# Patient Record
Sex: MALE | Employment: PART TIME | URBAN - METROPOLITAN AREA
[De-identification: names, ages, dates, MRNs, and addresses within clinical notes are randomized per-mention and may not be internally consistent; named-entity substitution may affect disease eponyms.]

---

## 2024-05-15 ENCOUNTER — HOSPITAL ENCOUNTER (EMERGENCY)
Age: 29
Discharge: HOME | End: 2024-05-15
Attending: EMERGENCY MEDICINE

## 2024-05-15 ENCOUNTER — APPOINTMENT (OUTPATIENT)
Dept: CT IMAGING | Age: 29
End: 2024-05-15

## 2024-05-15 VITALS
BODY MASS INDEX: 22.9 KG/M2 | HEIGHT: 70 IN | DIASTOLIC BLOOD PRESSURE: 77 MMHG | OXYGEN SATURATION: 100 % | TEMPERATURE: 97.9 F | RESPIRATION RATE: 18 BRPM | SYSTOLIC BLOOD PRESSURE: 115 MMHG | HEART RATE: 69 BPM | WEIGHT: 160 LBS

## 2024-05-15 DIAGNOSIS — E86.0 DEHYDRATION: Primary | ICD-10-CM

## 2024-05-15 DIAGNOSIS — R11.2 NAUSEA AND VOMITING, UNSPECIFIED VOMITING TYPE: ICD-10-CM

## 2024-05-15 LAB
ALBUMIN SERPL-MCNC: 5.1 G/DL (ref 3.5–4.6)
ALP SERPL-CCNC: 60 U/L (ref 35–104)
ALT SERPL-CCNC: 44 U/L (ref 0–41)
AMPHET UR QL SCN: ABNORMAL
ANION GAP SERPL CALCULATED.3IONS-SCNC: 17 MEQ/L (ref 9–15)
AST SERPL-CCNC: 27 U/L (ref 0–40)
BARBITURATES UR QL SCN: ABNORMAL
BASOPHILS # BLD: 0 K/UL (ref 0–0.2)
BASOPHILS NFR BLD: 0.2 %
BENZODIAZ UR QL SCN: ABNORMAL
BILIRUB SERPL-MCNC: 1.6 MG/DL (ref 0.2–0.7)
BILIRUB UR QL STRIP: NEGATIVE
BUN SERPL-MCNC: 10 MG/DL (ref 6–20)
CALCIUM SERPL-MCNC: 10 MG/DL (ref 8.5–9.9)
CANNABINOIDS UR QL SCN: POSITIVE
CHLORIDE SERPL-SCNC: 103 MEQ/L (ref 95–107)
CLARITY UR: CLEAR
CO2 SERPL-SCNC: 21 MEQ/L (ref 20–31)
COCAINE UR QL SCN: ABNORMAL
COLOR UR: YELLOW
CREAT SERPL-MCNC: 0.94 MG/DL (ref 0.7–1.2)
DRUG SCREEN COMMENT UR-IMP: ABNORMAL
EOSINOPHIL # BLD: 0 K/UL (ref 0–0.7)
EOSINOPHIL NFR BLD: 0.5 %
ERYTHROCYTE [DISTWIDTH] IN BLOOD BY AUTOMATED COUNT: 11.9 % (ref 11.5–14.5)
FENTANYL SCREEN, URINE: ABNORMAL
GLOBULIN SER CALC-MCNC: 2.7 G/DL (ref 2.3–3.5)
GLUCOSE SERPL-MCNC: 83 MG/DL (ref 70–99)
GLUCOSE UR STRIP-MCNC: NEGATIVE MG/DL
HCT VFR BLD AUTO: 47.5 % (ref 42–52)
HGB BLD-MCNC: 16.9 G/DL (ref 14–18)
HGB UR QL STRIP: NEGATIVE
INFLUENZA A BY PCR: NEGATIVE
INFLUENZA B BY PCR: NEGATIVE
KETONES UR STRIP-MCNC: >=80 MG/DL
LEUKOCYTE ESTERASE UR QL STRIP: NEGATIVE
LIPASE SERPL-CCNC: 19 U/L (ref 12–95)
LYMPHOCYTES # BLD: 1.5 K/UL (ref 1–4.8)
LYMPHOCYTES NFR BLD: 17.2 %
MAGNESIUM SERPL-MCNC: 2.5 MG/DL (ref 1.7–2.4)
MCH RBC QN AUTO: 31.6 PG (ref 27–31.3)
MCHC RBC AUTO-ENTMCNC: 35.6 % (ref 33–37)
MCV RBC AUTO: 88.8 FL (ref 79–92.2)
METHADONE UR QL SCN: ABNORMAL
MONOCYTES # BLD: 0.5 K/UL (ref 0.2–0.8)
MONOCYTES NFR BLD: 5.6 %
NEUTROPHILS # BLD: 6.8 K/UL (ref 1.4–6.5)
NEUTS SEG NFR BLD: 76.3 %
NITRITE UR QL STRIP: NEGATIVE
OPIATES UR QL SCN: ABNORMAL
OXYCODONE UR QL SCN: ABNORMAL
PCP UR QL SCN: ABNORMAL
PH UR STRIP: 7 [PH] (ref 5–9)
PLATELET # BLD AUTO: 270 K/UL (ref 130–400)
POTASSIUM SERPL-SCNC: 4.4 MEQ/L (ref 3.4–4.9)
PROPOXYPH UR QL SCN: ABNORMAL
PROT SERPL-MCNC: 7.8 G/DL (ref 6.3–8)
PROT UR STRIP-MCNC: NEGATIVE MG/DL
RBC # BLD AUTO: 5.35 M/UL (ref 4.7–6.1)
SARS-COV-2 RDRP RESP QL NAA+PROBE: NOT DETECTED
SODIUM SERPL-SCNC: 141 MEQ/L (ref 135–144)
SP GR UR STRIP: 1.04 (ref 1–1.03)
URINE REFLEX TO CULTURE: ABNORMAL
UROBILINOGEN UR STRIP-ACNC: 1 E.U./DL
WBC # BLD AUTO: 8.9 K/UL (ref 4.8–10.8)

## 2024-05-15 PROCEDURE — 96374 THER/PROPH/DIAG INJ IV PUSH: CPT

## 2024-05-15 PROCEDURE — 6360000002 HC RX W HCPCS

## 2024-05-15 PROCEDURE — 2500000003 HC RX 250 WO HCPCS

## 2024-05-15 PROCEDURE — 96361 HYDRATE IV INFUSION ADD-ON: CPT

## 2024-05-15 PROCEDURE — 2580000003 HC RX 258

## 2024-05-15 PROCEDURE — 99285 EMERGENCY DEPT VISIT HI MDM: CPT

## 2024-05-15 PROCEDURE — 80053 COMPREHEN METABOLIC PANEL: CPT

## 2024-05-15 PROCEDURE — 83735 ASSAY OF MAGNESIUM: CPT

## 2024-05-15 PROCEDURE — 96375 TX/PRO/DX INJ NEW DRUG ADDON: CPT

## 2024-05-15 PROCEDURE — 80307 DRUG TEST PRSMV CHEM ANLYZR: CPT

## 2024-05-15 PROCEDURE — 36415 COLL VENOUS BLD VENIPUNCTURE: CPT

## 2024-05-15 PROCEDURE — A4216 STERILE WATER/SALINE, 10 ML: HCPCS

## 2024-05-15 PROCEDURE — 83690 ASSAY OF LIPASE: CPT

## 2024-05-15 PROCEDURE — 74177 CT ABD & PELVIS W/CONTRAST: CPT

## 2024-05-15 PROCEDURE — 85025 COMPLETE CBC W/AUTO DIFF WBC: CPT

## 2024-05-15 PROCEDURE — 81003 URINALYSIS AUTO W/O SCOPE: CPT

## 2024-05-15 PROCEDURE — 87502 INFLUENZA DNA AMP PROBE: CPT

## 2024-05-15 PROCEDURE — 6360000004 HC RX CONTRAST MEDICATION: Performed by: EMERGENCY MEDICINE

## 2024-05-15 PROCEDURE — 87635 SARS-COV-2 COVID-19 AMP PRB: CPT

## 2024-05-15 RX ORDER — ONDANSETRON 4 MG/1
4 TABLET, ORALLY DISINTEGRATING ORAL 3 TIMES DAILY PRN
Qty: 15 TABLET | Refills: 0 | Status: ON HOLD | OUTPATIENT
Start: 2024-05-15

## 2024-05-15 RX ORDER — ONDANSETRON 2 MG/ML
4 INJECTION INTRAMUSCULAR; INTRAVENOUS ONCE
Status: COMPLETED | OUTPATIENT
Start: 2024-05-15 | End: 2024-05-15

## 2024-05-15 RX ORDER — 0.9 % SODIUM CHLORIDE 0.9 %
1000 INTRAVENOUS SOLUTION INTRAVENOUS ONCE
Status: COMPLETED | OUTPATIENT
Start: 2024-05-15 | End: 2024-05-15

## 2024-05-15 RX ORDER — KETOROLAC TROMETHAMINE 30 MG/ML
30 INJECTION, SOLUTION INTRAMUSCULAR; INTRAVENOUS ONCE
Status: COMPLETED | OUTPATIENT
Start: 2024-05-15 | End: 2024-05-15

## 2024-05-15 RX ADMIN — ONDANSETRON 4 MG: 2 INJECTION INTRAMUSCULAR; INTRAVENOUS at 15:44

## 2024-05-15 RX ADMIN — FAMOTIDINE 20 MG: 10 INJECTION, SOLUTION INTRAVENOUS at 15:44

## 2024-05-15 RX ADMIN — SODIUM CHLORIDE 1000 ML: 9 INJECTION, SOLUTION INTRAVENOUS at 15:43

## 2024-05-15 RX ADMIN — IOPAMIDOL 75 ML: 755 INJECTION, SOLUTION INTRAVENOUS at 16:33

## 2024-05-15 RX ADMIN — KETOROLAC TROMETHAMINE 30 MG: 30 INJECTION INTRAMUSCULAR; INTRAVENOUS at 15:44

## 2024-05-15 ASSESSMENT — ENCOUNTER SYMPTOMS
WHEEZING: 0
ABDOMINAL DISTENTION: 0
COUGH: 0
PHOTOPHOBIA: 0
VOMITING: 1
EYE DISCHARGE: 0
CHEST TIGHTNESS: 0
ABDOMINAL PAIN: 1
NAUSEA: 1
SORE THROAT: 0
SHORTNESS OF BREATH: 0

## 2024-05-15 ASSESSMENT — PAIN DESCRIPTION - LOCATION
LOCATION: ABDOMEN
LOCATION: ABDOMEN

## 2024-05-15 ASSESSMENT — PAIN - FUNCTIONAL ASSESSMENT: PAIN_FUNCTIONAL_ASSESSMENT: 0-10

## 2024-05-15 ASSESSMENT — PAIN DESCRIPTION - PAIN TYPE: TYPE: ACUTE PAIN

## 2024-05-15 ASSESSMENT — PAIN DESCRIPTION - ONSET: ONSET: ON-GOING

## 2024-05-15 ASSESSMENT — LIFESTYLE VARIABLES
HOW OFTEN DO YOU HAVE A DRINK CONTAINING ALCOHOL: NEVER
HOW MANY STANDARD DRINKS CONTAINING ALCOHOL DO YOU HAVE ON A TYPICAL DAY: PATIENT DOES NOT DRINK

## 2024-05-15 ASSESSMENT — PAIN SCALES - GENERAL
PAINLEVEL_OUTOF10: 6
PAINLEVEL_OUTOF10: 7

## 2024-05-15 ASSESSMENT — PAIN DESCRIPTION - FREQUENCY: FREQUENCY: CONTINUOUS

## 2024-05-15 NOTE — ED NOTES
Assumed care of the patient at 1814 to discharge the patient.   Discharge education reviewed.  Patient instructed to follow up with PCP and come back to the ED with any new or worsening symptoms.   No questions or concerns at this time.  Medication prescriptions reviewed.

## 2024-05-15 NOTE — ED NOTES
Assumed care of patient from Regi BLAIR at 1540.  Patient was given medications.   No questions or concerns at this time.

## 2024-05-15 NOTE — ED PROVIDER NOTES
[facility] ED  eMERGENCY dEPARTMENT eNCOUnter      Pt Name: Cameron Waddell  MRN: [id number]  Birthdate 1995  Date of evaluation: 5/15/2024  Provider: Fred Laughlin MD    CHIEF COMPLAINT       Chief Complaint   Patient presents with    Abdominal Pain     With emesis x1 day    Fatigue     X3-4 weeks         HISTORY OF PRESENT ILLNESS   (Location/Symptom, Timing/Onset,Context/Setting, Quality, Duration, Modifying Factors, Severity)  Note limiting factors.   Cameron Waddell is a 29 y.o. male who presents to the emergency department for evaluation of nausea vomiting and abdominal pain.  Patient states he started feeling ill about 3 weeks ago with general malaise.  Symptoms progressed to a generalized lower abdominal pain and periumbilical starting 3 days ago.  In the last 24 hours he began vomiting and vomited several times overnight and into this morning.  No related fever or chills.  No urinary complaints.  No diarrhea.  No back pain.    HPI    NursingNotes were reviewed.    REVIEW OF SYSTEMS    (2-9 systems for level 4, 10 or more for level 5)     Review of Systems   Constitutional:  Negative for chills and diaphoresis.   HENT:  Negative for congestion, ear pain, mouth sores and sore throat.    Eyes:  Negative for photophobia and discharge.   Respiratory:  Negative for cough, chest tightness, shortness of breath and wheezing.    Cardiovascular:  Negative for chest pain and palpitations.   Gastrointestinal:  Positive for abdominal pain, nausea and vomiting. Negative for abdominal distention.   Endocrine: Negative for cold intolerance.   Genitourinary:  Negative for difficulty urinating.   Musculoskeletal:  Negative for arthralgias.   Skin:  Negative for pallor and rash.   Allergic/Immunologic: Negative for immunocompromised state.   Neurological:  Negative for dizziness and syncope.   Hematological:  Negative for adenopathy.   Psychiatric/Behavioral:  Negative for agitation and hallucinations.

## 2024-05-17 ENCOUNTER — APPOINTMENT (OUTPATIENT)
Dept: CT IMAGING | Age: 29
DRG: 103 | End: 2024-05-17

## 2024-05-17 ENCOUNTER — HOSPITAL ENCOUNTER (INPATIENT)
Age: 29
LOS: 3 days | Discharge: NURSING, REHAB OR LONG-TERM CARE | DRG: 103 | End: 2024-05-20
Attending: STUDENT IN AN ORGANIZED HEALTH CARE EDUCATION/TRAINING PROGRAM | Admitting: INTERNAL MEDICINE

## 2024-05-17 DIAGNOSIS — I63.9 CEREBROVASCULAR ACCIDENT (CVA), UNSPECIFIED MECHANISM (HCC): Primary | ICD-10-CM

## 2024-05-17 DIAGNOSIS — I63.213 CEREBROVASCULAR ACCIDENT (CVA) DUE TO BILATERAL OCCLUSION OF VERTEBRAL ARTERIES (HCC): ICD-10-CM

## 2024-05-17 DIAGNOSIS — I63.113 CEREBROVASCULAR ACCIDENT (CVA) DUE TO BILATERAL EMBOLISM OF VERTEBRAL ARTERIES (HCC): ICD-10-CM

## 2024-05-17 PROBLEM — R29.90 STROKE-LIKE SYMPTOMS: Status: ACTIVE | Noted: 2024-05-17

## 2024-05-17 LAB
ALBUMIN SERPL-MCNC: 4.8 G/DL (ref 3.5–4.6)
ALP SERPL-CCNC: 53 U/L (ref 35–104)
ALT SERPL-CCNC: 36 U/L (ref 0–41)
AMPHET UR QL SCN: ABNORMAL
ANION GAP SERPL CALCULATED.3IONS-SCNC: 16 MEQ/L (ref 9–15)
AST SERPL-CCNC: 30 U/L (ref 0–40)
BARBITURATES UR QL SCN: ABNORMAL
BASOPHILS # BLD: 0 K/UL (ref 0–0.2)
BASOPHILS NFR BLD: 0.2 %
BENZODIAZ UR QL SCN: ABNORMAL
BILIRUB SERPL-MCNC: 0.8 MG/DL (ref 0.2–0.7)
BUN SERPL-MCNC: 8 MG/DL (ref 6–20)
CALCIUM SERPL-MCNC: 9.7 MG/DL (ref 8.5–9.9)
CANNABINOIDS UR QL SCN: POSITIVE
CHLORIDE SERPL-SCNC: 105 MEQ/L (ref 95–107)
CO2 SERPL-SCNC: 19 MEQ/L (ref 20–31)
COCAINE UR QL SCN: ABNORMAL
CREAT SERPL-MCNC: 0.9 MG/DL (ref 0.7–1.2)
DRUG SCREEN COMMENT UR-IMP: ABNORMAL
EOSINOPHIL # BLD: 0 K/UL (ref 0–0.7)
EOSINOPHIL NFR BLD: 0.2 %
ERYTHROCYTE [DISTWIDTH] IN BLOOD BY AUTOMATED COUNT: 11.9 % (ref 11.5–14.5)
ETHANOL PERCENT: NORMAL G/DL
ETHANOLAMINE SERPL-MCNC: <10 MG/DL (ref 0–0.08)
FENTANYL SCREEN, URINE: ABNORMAL
GLOBULIN SER CALC-MCNC: 2.7 G/DL (ref 2.3–3.5)
GLUCOSE BLD-MCNC: 90 MG/DL (ref 70–99)
GLUCOSE SERPL-MCNC: 96 MG/DL (ref 70–99)
HCT VFR BLD AUTO: 47.1 % (ref 42–52)
HGB BLD-MCNC: 17.1 G/DL (ref 14–18)
INR PPP: 0.9
LYMPHOCYTES # BLD: 2.1 K/UL (ref 1–4.8)
LYMPHOCYTES NFR BLD: 26.3 %
MCH RBC QN AUTO: 31.8 PG (ref 27–31.3)
MCHC RBC AUTO-ENTMCNC: 36.3 % (ref 33–37)
MCV RBC AUTO: 87.5 FL (ref 79–92.2)
METHADONE UR QL SCN: ABNORMAL
MONOCYTES # BLD: 0.6 K/UL (ref 0.2–0.8)
MONOCYTES NFR BLD: 6.8 %
NEUTROPHILS # BLD: 5.3 K/UL (ref 1.4–6.5)
NEUTS SEG NFR BLD: 66.3 %
OPIATES UR QL SCN: ABNORMAL
OXYCODONE UR QL SCN: ABNORMAL
PCP UR QL SCN: ABNORMAL
PERFORMED ON: NORMAL
PHOSPHATE SERPL-MCNC: 1.3 MG/DL (ref 2.3–4.8)
PLATELET # BLD AUTO: 290 K/UL (ref 130–400)
POTASSIUM SERPL-SCNC: 4.5 MEQ/L (ref 3.4–4.9)
PROPOXYPH UR QL SCN: ABNORMAL
PROT SERPL-MCNC: 7.5 G/DL (ref 6.3–8)
PROTHROMBIN TIME: 12.3 SEC (ref 12.3–14.9)
RBC # BLD AUTO: 5.38 M/UL (ref 4.7–6.1)
SODIUM SERPL-SCNC: 140 MEQ/L (ref 135–144)
TROPONIN, HIGH SENSITIVITY: <6 NG/L (ref 0–19)
TSH REFLEX: 1.9 UIU/ML (ref 0.44–3.86)
WBC # BLD AUTO: 8 K/UL (ref 4.8–10.8)

## 2024-05-17 PROCEDURE — 84100 ASSAY OF PHOSPHORUS: CPT

## 2024-05-17 PROCEDURE — 82077 ASSAY SPEC XCP UR&BREATH IA: CPT

## 2024-05-17 PROCEDURE — 70450 CT HEAD/BRAIN W/O DYE: CPT

## 2024-05-17 PROCEDURE — 6370000000 HC RX 637 (ALT 250 FOR IP): Performed by: INTERNAL MEDICINE

## 2024-05-17 PROCEDURE — 80053 COMPREHEN METABOLIC PANEL: CPT

## 2024-05-17 PROCEDURE — 70496 CT ANGIOGRAPHY HEAD: CPT

## 2024-05-17 PROCEDURE — 85025 COMPLETE CBC W/AUTO DIFF WBC: CPT

## 2024-05-17 PROCEDURE — 82330 ASSAY OF CALCIUM: CPT

## 2024-05-17 PROCEDURE — 93005 ELECTROCARDIOGRAM TRACING: CPT | Performed by: STUDENT IN AN ORGANIZED HEALTH CARE EDUCATION/TRAINING PROGRAM

## 2024-05-17 PROCEDURE — 80307 DRUG TEST PRSMV CHEM ANLYZR: CPT

## 2024-05-17 PROCEDURE — 99285 EMERGENCY DEPT VISIT HI MDM: CPT

## 2024-05-17 PROCEDURE — 37195 THROMBOLYTIC THERAPY STROKE: CPT

## 2024-05-17 PROCEDURE — 1210000000 HC MED SURG R&B

## 2024-05-17 PROCEDURE — 6360000004 HC RX CONTRAST MEDICATION: Performed by: STUDENT IN AN ORGANIZED HEALTH CARE EDUCATION/TRAINING PROGRAM

## 2024-05-17 PROCEDURE — 70498 CT ANGIOGRAPHY NECK: CPT

## 2024-05-17 PROCEDURE — 2580000003 HC RX 258: Performed by: STUDENT IN AN ORGANIZED HEALTH CARE EDUCATION/TRAINING PROGRAM

## 2024-05-17 PROCEDURE — 84443 ASSAY THYROID STIM HORMONE: CPT

## 2024-05-17 PROCEDURE — 6360000002 HC RX W HCPCS: Performed by: STUDENT IN AN ORGANIZED HEALTH CARE EDUCATION/TRAINING PROGRAM

## 2024-05-17 PROCEDURE — 85610 PROTHROMBIN TIME: CPT

## 2024-05-17 PROCEDURE — 84484 ASSAY OF TROPONIN QUANT: CPT

## 2024-05-17 PROCEDURE — 3E03317 INTRODUCTION OF OTHER THROMBOLYTIC INTO PERIPHERAL VEIN, PERCUTANEOUS APPROACH: ICD-10-PCS | Performed by: INTERNAL MEDICINE

## 2024-05-17 RX ORDER — ATORVASTATIN CALCIUM 40 MG/1
40 TABLET, FILM COATED ORAL NIGHTLY
Status: DISCONTINUED | OUTPATIENT
Start: 2024-05-17 | End: 2024-05-20 | Stop reason: HOSPADM

## 2024-05-17 RX ORDER — ASPIRIN 300 MG/1
300 SUPPOSITORY RECTAL DAILY
Status: DISCONTINUED | OUTPATIENT
Start: 2024-05-18 | End: 2024-05-20 | Stop reason: HOSPADM

## 2024-05-17 RX ORDER — SODIUM CHLORIDE 0.9 % (FLUSH) 0.9 %
5-40 SYRINGE (ML) INJECTION PRN
Status: DISCONTINUED | OUTPATIENT
Start: 2024-05-17 | End: 2024-05-20 | Stop reason: HOSPADM

## 2024-05-17 RX ORDER — ASPIRIN 81 MG/1
81 TABLET, CHEWABLE ORAL DAILY
Status: DISCONTINUED | OUTPATIENT
Start: 2024-05-18 | End: 2024-05-20 | Stop reason: HOSPADM

## 2024-05-17 RX ORDER — ENOXAPARIN SODIUM 100 MG/ML
40 INJECTION SUBCUTANEOUS DAILY
Status: DISCONTINUED | OUTPATIENT
Start: 2024-05-18 | End: 2024-05-20 | Stop reason: HOSPADM

## 2024-05-17 RX ORDER — LORAZEPAM 2 MG/ML
0.5 INJECTION INTRAMUSCULAR ONCE
Status: COMPLETED | OUTPATIENT
Start: 2024-05-17 | End: 2024-05-17

## 2024-05-17 RX ORDER — HYDRALAZINE HYDROCHLORIDE 20 MG/ML
10 INJECTION INTRAMUSCULAR; INTRAVENOUS EVERY 6 HOURS PRN
Status: DISCONTINUED | OUTPATIENT
Start: 2024-05-17 | End: 2024-05-20 | Stop reason: HOSPADM

## 2024-05-17 RX ORDER — SODIUM CHLORIDE 0.9 % (FLUSH) 0.9 %
10 SYRINGE (ML) INJECTION ONCE
Status: COMPLETED | OUTPATIENT
Start: 2024-05-17 | End: 2024-05-17

## 2024-05-17 RX ORDER — ONDANSETRON 4 MG/1
4 TABLET, ORALLY DISINTEGRATING ORAL EVERY 8 HOURS PRN
Status: DISCONTINUED | OUTPATIENT
Start: 2024-05-17 | End: 2024-05-20 | Stop reason: HOSPADM

## 2024-05-17 RX ORDER — SODIUM CHLORIDE 0.9 % (FLUSH) 0.9 %
5-40 SYRINGE (ML) INJECTION EVERY 12 HOURS SCHEDULED
Status: DISCONTINUED | OUTPATIENT
Start: 2024-05-17 | End: 2024-05-20 | Stop reason: HOSPADM

## 2024-05-17 RX ORDER — POLYETHYLENE GLYCOL 3350 17 G/17G
17 POWDER, FOR SOLUTION ORAL DAILY PRN
Status: DISCONTINUED | OUTPATIENT
Start: 2024-05-17 | End: 2024-05-20 | Stop reason: HOSPADM

## 2024-05-17 RX ORDER — ONDANSETRON 2 MG/ML
4 INJECTION INTRAMUSCULAR; INTRAVENOUS EVERY 6 HOURS PRN
Status: DISCONTINUED | OUTPATIENT
Start: 2024-05-17 | End: 2024-05-20 | Stop reason: HOSPADM

## 2024-05-17 RX ORDER — SODIUM CHLORIDE 9 MG/ML
INJECTION, SOLUTION INTRAVENOUS PRN
Status: DISCONTINUED | OUTPATIENT
Start: 2024-05-17 | End: 2024-05-20 | Stop reason: HOSPADM

## 2024-05-17 RX ADMIN — SODIUM CHLORIDE, PRESERVATIVE FREE 10 ML: 5 INJECTION INTRAVENOUS at 22:24

## 2024-05-17 RX ADMIN — Medication 0.5 MG: at 22:23

## 2024-05-17 RX ADMIN — TENECTEPLASE 18 MG: KIT at 19:54

## 2024-05-17 RX ADMIN — ATORVASTATIN CALCIUM 40 MG: 40 TABLET, FILM COATED ORAL at 22:14

## 2024-05-17 RX ADMIN — SODIUM CHLORIDE, PRESERVATIVE FREE 10 ML: 5 INJECTION INTRAVENOUS at 19:54

## 2024-05-17 RX ADMIN — IOPAMIDOL 75 ML: 755 INJECTION, SOLUTION INTRAVENOUS at 19:27

## 2024-05-17 NOTE — ED PROVIDER NOTES
CONTRAST   Final Result   Addendum (preliminary) 1 of 1   ADDENDUM:   Results were called by core team to Dr. BECERRA on 5/17/2024 at 19:59.         Final   No acute intracranial abnormality.            CTA HEAD W WO CONTRAST   Final Result   1. No intracranial arterial stenosis.   2. No evidence of stenosis involving the carotid arteries or vertebral   arteries.         CTA NECK W WO CONTRAST   Final Result   1. No intracranial arterial stenosis.   2. No evidence of stenosis involving the carotid arteries or vertebral   arteries.               ED BEDSIDE ULTRASOUND:   Performed by ED Physician - none    LABS:  Labs Reviewed   CBC WITH AUTO DIFFERENTIAL - Abnormal; Notable for the following components:       Result Value    MCH 31.8 (*)     All other components within normal limits   COMPREHENSIVE METABOLIC PANEL W/ REFLEX TO MG FOR LOW K - Abnormal; Notable for the following components:    CO2 19 (*)     Anion Gap 16 (*)     Albumin 4.8 (*)     Total Bilirubin 0.8 (*)     All other components within normal limits   PHOSPHORUS - Abnormal; Notable for the following components:    Phosphorus 1.3 (*)     All other components within normal limits   URINE DRUG SCREEN - Abnormal; Notable for the following components:    Cannabinoid Scrn, Ur POSITIVE (*)     All other components within normal limits   PROTIME-INR   TROPONIN   TSH WITH REFLEX   ETHANOL   CALCIUM, IONIZED   POCT GLUCOSE   POCT GLUCOSE       All other labs were within normal range or not returned as of this dictation.    EMERGENCY DEPARTMENT COURSE and DIFFERENTIAL DIAGNOSIS/MDM:   Vitals:    Vitals:    05/17/24 2015 05/17/24 2030 05/17/24 2045 05/17/24 2100   BP: 123/88 123/83 122/81 112/75   Pulse: 56 72 63 62   Resp: (!) 0 14 12 11   Temp:       SpO2: 99% 100% 99% 100%   Weight:           29-year-old male presenting to the emergency department for evaluation of sudden onset altered mental status.  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

## 2024-05-18 ENCOUNTER — APPOINTMENT (OUTPATIENT)
Dept: MRI IMAGING | Age: 29
DRG: 103 | End: 2024-05-18
Attending: PSYCHIATRY & NEUROLOGY

## 2024-05-18 ENCOUNTER — APPOINTMENT (OUTPATIENT)
Age: 29
DRG: 103 | End: 2024-05-18
Attending: PSYCHIATRY & NEUROLOGY

## 2024-05-18 LAB
B-OH-BUTYR SERPL-SCNC: 0.8 MG/DL (ref 0.2–2.8)
C-REACTIVE PROTEIN, HIGH SENSITIVITY: 1.1 MG/L (ref 0–5)
CHOLEST SERPL-MCNC: 122 MG/DL (ref 0–199)
ECHO AO ROOT DIAM: 2.6 CM
ECHO AO ROOT INDEX: 1.36 CM/M2
ECHO AV AREA PEAK VELOCITY: 2 CM2
ECHO AV AREA VTI: 1.8 CM2
ECHO AV AREA/BSA PEAK VELOCITY: 1 CM2/M2
ECHO AV AREA/BSA VTI: 0.9 CM2/M2
ECHO AV CUSP MM: 1.5 CM
ECHO AV MEAN GRADIENT: 3 MMHG
ECHO AV MEAN VELOCITY: 0.9 M/S
ECHO AV PEAK GRADIENT: 6 MMHG
ECHO AV PEAK VELOCITY: 1.2 M/S
ECHO AV VELOCITY RATIO: 0.92
ECHO AV VTI: 27.2 CM
ECHO BSA: 1.91 M2
ECHO LA DIAMETER INDEX: 1.41 CM/M2
ECHO LA DIAMETER: 2.7 CM
ECHO LA TO AORTIC ROOT RATIO: 1.04
ECHO LA VOL A-L A2C: 40 ML (ref 18–58)
ECHO LA VOL A-L A4C: 49 ML (ref 18–58)
ECHO LA VOL MOD A2C: 39 ML (ref 18–58)
ECHO LA VOL MOD A4C: 43 ML (ref 18–58)
ECHO LA VOLUME AREA LENGTH: 46 ML
ECHO LA VOLUME INDEX A-L A2C: 21 ML/M2 (ref 16–34)
ECHO LA VOLUME INDEX A-L A4C: 26 ML/M2 (ref 16–34)
ECHO LA VOLUME INDEX AREA LENGTH: 24 ML/M2 (ref 16–34)
ECHO LA VOLUME INDEX MOD A2C: 20 ML/M2 (ref 16–34)
ECHO LA VOLUME INDEX MOD A4C: 23 ML/M2 (ref 16–34)
ECHO LV E' LATERAL VELOCITY: 18 CM/S
ECHO LV E' SEPTAL VELOCITY: 17 CM/S
ECHO LV EDV A2C: 83 ML
ECHO LV EDV A4C: 100 ML
ECHO LV EDV BP: 92 ML (ref 67–155)
ECHO LV EDV INDEX A4C: 52 ML/M2
ECHO LV EDV INDEX BP: 48 ML/M2
ECHO LV EDV NDEX A2C: 43 ML/M2
ECHO LV EJECTION FRACTION A2C: 58 %
ECHO LV EJECTION FRACTION A4C: 64 %
ECHO LV EJECTION FRACTION BIPLANE: 62 % (ref 55–100)
ECHO LV ESV A2C: 35 ML
ECHO LV ESV A4C: 36 ML
ECHO LV ESV BP: 35 ML (ref 22–58)
ECHO LV ESV INDEX A2C: 18 ML/M2
ECHO LV ESV INDEX A4C: 19 ML/M2
ECHO LV ESV INDEX BP: 18 ML/M2
ECHO LV INTERNAL DIMENSION DIASTOLE INDEX: 2.3 CM/M2
ECHO LV INTERNAL DIMENSION DIASTOLIC: 4.4 CM (ref 4.2–5.9)
ECHO LV IVSD: 0.8 CM (ref 0.6–1)
ECHO LV MASS 2D: 137.8 G (ref 88–224)
ECHO LV MASS INDEX 2D: 72.1 G/M2 (ref 49–115)
ECHO LV POSTERIOR WALL DIASTOLIC: 1.1 CM (ref 0.6–1)
ECHO LV RELATIVE WALL THICKNESS RATIO: 0.5
ECHO LVOT AREA: 2.3 CM2
ECHO LVOT AV VTI INDEX: 0.85
ECHO LVOT DIAM: 1.7 CM
ECHO LVOT MEAN GRADIENT: 3 MMHG
ECHO LVOT PEAK GRADIENT: 5 MMHG
ECHO LVOT PEAK VELOCITY: 1.1 M/S
ECHO LVOT STROKE VOLUME INDEX: 27.4 ML/M2
ECHO LVOT SV: 52.4 ML
ECHO LVOT VTI: 23.1 CM
ECHO MV A VELOCITY: 0.45 M/S
ECHO MV E DECELERATION TIME (DT): 141.4 MS
ECHO MV E VELOCITY: 0.85 M/S
ECHO MV E/A RATIO: 1.89
ECHO MV E/E' LATERAL: 4.72
ECHO MV E/E' RATIO (AVERAGED): 4.86
ECHO MV E/E' SEPTAL: 5
ECHO MV REGURGITANT PEAK GRADIENT: 92 MMHG
ECHO MV REGURGITANT PEAK VELOCITY: 4.8 M/S
ECHO PV MAX VELOCITY: 0.9 M/S
ECHO PV PEAK GRADIENT: 3 MMHG
ECHO PVEIN PEAK D VELOCITY: 0.8 M/S
ECHO PVEIN PEAK S VELOCITY: 0.5 M/S
ECHO PVEIN S/D RATIO: 0.6
ECHO RV INTERNAL DIMENSION: 3 CM
ECHO RV TAPSE: 1.9 CM (ref 1.7–?)
ECHO TV REGURGITANT MAX VELOCITY: 2.44 M/S
ECHO TV REGURGITANT PEAK GRADIENT: 24 MMHG
ERYTHROCYTE [DISTWIDTH] IN BLOOD BY AUTOMATED COUNT: 11.8 % (ref 11.5–14.5)
ERYTHROCYTE [SEDIMENTATION RATE] IN BLOOD BY WESTERGREN METHOD: 1 MM (ref 0–10)
ESTIMATED AVERAGE GLUCOSE: 85 MG/DL
HBA1C MFR BLD: 4.6 % (ref 4–6)
HCT VFR BLD AUTO: 43.3 % (ref 42–52)
HDLC SERPL-MCNC: 41 MG/DL (ref 40–59)
HGB BLD-MCNC: 15.4 G/DL (ref 14–18)
LDLC SERPL CALC-MCNC: 63 MG/DL (ref 0–129)
MCH RBC QN AUTO: 31.5 PG (ref 27–31.3)
MCHC RBC AUTO-ENTMCNC: 35.6 % (ref 33–37)
MCV RBC AUTO: 88.5 FL (ref 79–92.2)
PHOSPHATE SERPL-MCNC: 4 MG/DL (ref 2.3–4.8)
PLATELET # BLD AUTO: 241 K/UL (ref 130–400)
RBC # BLD AUTO: 4.89 M/UL (ref 4.7–6.1)
TRIGL SERPL-MCNC: 92 MG/DL (ref 0–150)
WBC # BLD AUTO: 6.8 K/UL (ref 4.8–10.8)

## 2024-05-18 PROCEDURE — 83516 IMMUNOASSAY NONANTIBODY: CPT

## 2024-05-18 PROCEDURE — 86141 C-REACTIVE PROTEIN HS: CPT

## 2024-05-18 PROCEDURE — 85610 PROTHROMBIN TIME: CPT

## 2024-05-18 PROCEDURE — 6370000000 HC RX 637 (ALT 250 FOR IP): Performed by: INTERNAL MEDICINE

## 2024-05-18 PROCEDURE — 6360000004 HC RX CONTRAST MEDICATION: Performed by: PSYCHIATRY & NEUROLOGY

## 2024-05-18 PROCEDURE — 86147 CARDIOLIPIN ANTIBODY EA IG: CPT

## 2024-05-18 PROCEDURE — 1210000000 HC MED SURG R&B

## 2024-05-18 PROCEDURE — 86235 NUCLEAR ANTIGEN ANTIBODY: CPT

## 2024-05-18 PROCEDURE — 85027 COMPLETE CBC AUTOMATED: CPT

## 2024-05-18 PROCEDURE — 2580000003 HC RX 258: Performed by: INTERNAL MEDICINE

## 2024-05-18 PROCEDURE — 6360000002 HC RX W HCPCS: Performed by: INTERNAL MEDICINE

## 2024-05-18 PROCEDURE — 84100 ASSAY OF PHOSPHORUS: CPT

## 2024-05-18 PROCEDURE — 86225 DNA ANTIBODY NATIVE: CPT

## 2024-05-18 PROCEDURE — 36415 COLL VENOUS BLD VENIPUNCTURE: CPT

## 2024-05-18 PROCEDURE — 85730 THROMBOPLASTIN TIME PARTIAL: CPT

## 2024-05-18 PROCEDURE — 85302 CLOT INHIBIT PROT C ANTIGEN: CPT

## 2024-05-18 PROCEDURE — 85652 RBC SED RATE AUTOMATED: CPT

## 2024-05-18 PROCEDURE — 93306 TTE W/DOPPLER COMPLETE: CPT

## 2024-05-18 PROCEDURE — 93306 TTE W/DOPPLER COMPLETE: CPT | Performed by: INTERNAL MEDICINE

## 2024-05-18 PROCEDURE — 83036 HEMOGLOBIN GLYCOSYLATED A1C: CPT

## 2024-05-18 PROCEDURE — 80061 LIPID PANEL: CPT

## 2024-05-18 PROCEDURE — 70546 MR ANGIOGRAPH HEAD W/O&W/DYE: CPT

## 2024-05-18 PROCEDURE — A9579 GAD-BASE MR CONTRAST NOS,1ML: HCPCS | Performed by: PSYCHIATRY & NEUROLOGY

## 2024-05-18 PROCEDURE — 85613 RUSSELL VIPER VENOM DILUTED: CPT

## 2024-05-18 PROCEDURE — 82010 KETONE BODYS QUAN: CPT

## 2024-05-18 PROCEDURE — 81241 F5 GENE: CPT

## 2024-05-18 PROCEDURE — 85305 CLOT INHIBIT PROT S TOTAL: CPT

## 2024-05-18 PROCEDURE — 2500000003 HC RX 250 WO HCPCS: Performed by: INTERNAL MEDICINE

## 2024-05-18 PROCEDURE — 70553 MRI BRAIN STEM W/O & W/DYE: CPT

## 2024-05-18 PROCEDURE — 99291 CRITICAL CARE FIRST HOUR: CPT | Performed by: PSYCHIATRY & NEUROLOGY

## 2024-05-18 PROCEDURE — 2580000003 HC RX 258: Performed by: STUDENT IN AN ORGANIZED HEALTH CARE EDUCATION/TRAINING PROGRAM

## 2024-05-18 RX ORDER — THIAMINE HYDROCHLORIDE 100 MG/ML
500 INJECTION, SOLUTION INTRAMUSCULAR; INTRAVENOUS EVERY 8 HOURS
Status: DISCONTINUED | OUTPATIENT
Start: 2024-05-18 | End: 2024-05-18

## 2024-05-18 RX ORDER — LANOLIN ALCOHOL/MO/W.PET/CERES
100 CREAM (GRAM) TOPICAL DAILY
Status: DISCONTINUED | OUTPATIENT
Start: 2024-05-19 | End: 2024-05-20 | Stop reason: HOSPADM

## 2024-05-18 RX ORDER — MECOBALAMIN 5000 MCG
5 TABLET,DISINTEGRATING ORAL NIGHTLY
Status: DISCONTINUED | OUTPATIENT
Start: 2024-05-18 | End: 2024-05-20 | Stop reason: HOSPADM

## 2024-05-18 RX ORDER — DEXTROSE AND SODIUM CHLORIDE 5; .9 G/100ML; G/100ML
INJECTION, SOLUTION INTRAVENOUS CONTINUOUS
Status: DISPENSED | OUTPATIENT
Start: 2024-05-18 | End: 2024-05-18

## 2024-05-18 RX ADMIN — Medication 10 ML: at 09:57

## 2024-05-18 RX ADMIN — ATORVASTATIN CALCIUM 40 MG: 40 TABLET, FILM COATED ORAL at 21:27

## 2024-05-18 RX ADMIN — THIAMINE HYDROCHLORIDE 500 MG: 100 INJECTION, SOLUTION INTRAMUSCULAR; INTRAVENOUS at 21:27

## 2024-05-18 RX ADMIN — DEXTROSE AND SODIUM CHLORIDE: 5; 900 INJECTION, SOLUTION INTRAVENOUS at 09:56

## 2024-05-18 RX ADMIN — SODIUM PHOSPHATE, MONOBASIC, MONOHYDRATE AND SODIUM PHOSPHATE, DIBASIC, ANHYDROUS 30 MMOL: 142; 276 INJECTION, SOLUTION INTRAVENOUS at 09:03

## 2024-05-18 RX ADMIN — THIAMINE HYDROCHLORIDE 500 MG: 100 INJECTION, SOLUTION INTRAMUSCULAR; INTRAVENOUS at 09:50

## 2024-05-18 RX ADMIN — ENOXAPARIN SODIUM 40 MG: 100 INJECTION SUBCUTANEOUS at 21:27

## 2024-05-18 RX ADMIN — ASPIRIN 81 MG 81 MG: 81 TABLET ORAL at 21:27

## 2024-05-18 RX ADMIN — GADOTERIDOL 15 ML: 279.3 INJECTION, SOLUTION INTRAVENOUS at 13:28

## 2024-05-18 NOTE — ED NOTES
Patient appears to be  sleeping. Respirations are even and unlabored. No distress noted at this time.

## 2024-05-18 NOTE — CONSULTS
Subjective:      Patient ID: Cameron Waddell is a 29 y.o. male who presents today for stroke like symptoms..    HPI 29-year-old right-handed gentleman with a history of strokelike symptoms.  The patient works at [employer] and was brought with sudden change in mental status.  Other neurological findings with disconjugate gaze.  I discussed this case with the ER physician Dr. 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  He also had crossed limb weakness from left upper and right lower and that was disoriented.  At that time we were not aware that he had a headache as he was not able to comprehend very well.  Given these findings and acute onset it likely is a vascular etiology and therefore patient became a candidate for TNK with a NIH score of 11.  The other differential diagnosis and situation is demyelination seen in multiple sclerosis or just plain vasospasm.  Patient had smoked considerable amount of marijuana in the morning but he reports today that he has done this before and nothing has happened.  He did though have a bad headache prior to coming in which she had not reported.  Confusional basilar migraine is another possibility.  Not taking the chance we did treat him with TNK and he improved within about 10 minutes.  At this time is completely nonfocal.  He has had 2 seizures in January for which she had not seek to any attention.  Patient has a history of significant alcohol use in the past but not recent.  Patient may have a low threshold for seizures.    Review of Systems   Constitutional:  Negative for fever.   HENT:  Negative for ear pain, tinnitus and trouble swallowing.    Eyes:  Negative for photophobia and visual disturbance.   Respiratory:  Negative for choking and shortness of breath.    Cardiovascular:  Negative for chest pain and palpitations.   Gastrointestinal:  Negative for nausea and vomiting.   Musculoskeletal:  Negative for back pain, gait

## 2024-05-18 NOTE — H&P
and/or weight based adjustment of the mA/kV was utilized to reduce the radiation dose to as low as reasonably achievable. COMPARISON: None HISTORY: ORDERING SYSTEM PROVIDED HISTORY: stroke symptoms TECHNOLOGIST PROVIDED HISTORY: Reason for exam:->stroke symptoms Additional Contrast?->1 What reading provider will be dictating this exam?->CRC FINDINGS: CTA head: Anterior cerebral arteries and middle cerebral arteries are patent without evidence of stenosis.  No stenosis involving the posterior cerebral arteries. No evidence of stenosis involving the basilar artery.  Right vertebral artery appears dominant.  No evidence of dural venous sinus thrombosis.  No intracranial aneurysm CTA neck: Common carotid arteries are patent without evidence of stenosis.  Carotid bulbs are unremarkable.  No evidence of stenosis or dissection involving the carotid arteries.  Right vertebral artery is dominant.  No evidence of stenosis or dissection involving the carotid arteries.     1. No intracranial arterial stenosis. 2. No evidence of stenosis involving the carotid arteries or vertebral arteries.     CTA NECK W WO CONTRAST    Result Date: 5/17/2024  EXAMINATION: CTA OF THE HEAD WITHOUT AND WITH CONTRAST; CTA OF THE NECK 5/17/2024 7:19 pm TECHNIQUE: CTA of the head/brain was performed without and with the administration of intravenous contrast. Multiplanar reformatted images are provided for review. MIP images are provided for review. Automated exposure control, iterative reconstruction, and/or weight based adjustment of the mA/kV was utilized to reduce the radiation dose to as low as reasonably achievable.; CTA of the neck was performed with the administration of intravenous contrast. Multiplanar reformatted images are provided for review.  MIP images are provided for review. Stenosis of the internal carotid arteries measured using NASCET criteria. Automated exposure control, iterative reconstruction, and/or weight based adjustment of

## 2024-05-19 PROCEDURE — 2580000003 HC RX 258: Performed by: INTERNAL MEDICINE

## 2024-05-19 PROCEDURE — 99232 SBSQ HOSP IP/OBS MODERATE 35: CPT | Performed by: PSYCHIATRY & NEUROLOGY

## 2024-05-19 PROCEDURE — 6360000002 HC RX W HCPCS: Performed by: INTERNAL MEDICINE

## 2024-05-19 PROCEDURE — 1210000000 HC MED SURG R&B

## 2024-05-19 PROCEDURE — 2580000003 HC RX 258: Performed by: STUDENT IN AN ORGANIZED HEALTH CARE EDUCATION/TRAINING PROGRAM

## 2024-05-19 PROCEDURE — 6370000000 HC RX 637 (ALT 250 FOR IP): Performed by: INTERNAL MEDICINE

## 2024-05-19 RX ADMIN — Medication 10 ML: at 08:43

## 2024-05-19 RX ADMIN — ATORVASTATIN CALCIUM 40 MG: 40 TABLET, FILM COATED ORAL at 21:19

## 2024-05-19 RX ADMIN — ASPIRIN 81 MG 81 MG: 81 TABLET ORAL at 08:43

## 2024-05-19 RX ADMIN — Medication 10 ML: at 21:21

## 2024-05-19 RX ADMIN — Medication 5 MG: at 21:20

## 2024-05-19 RX ADMIN — ENOXAPARIN SODIUM 40 MG: 100 INJECTION SUBCUTANEOUS at 08:43

## 2024-05-19 RX ADMIN — Medication 10 ML: at 08:45

## 2024-05-19 RX ADMIN — Medication 100 MG: at 08:42

## 2024-05-19 ASSESSMENT — PAIN SCALES - GENERAL
PAINLEVEL_OUTOF10: 0
PAINLEVEL_OUTOF10: 0

## 2024-05-19 NOTE — PLAN OF CARE
Problem: Pain  Goal: Verbalizes/displays adequate comfort level or baseline comfort level  5/19/2024 1041 by William Sy RN  Outcome: Adequate for Discharge  5/19/2024 0224 by Loki Hinojosa RN  Outcome: Adequate for Discharge     Problem: Safety - Adult  Goal: Free from fall injury  Outcome: Adequate for Discharge     Problem: ABCDS Injury Assessment  Goal: Absence of physical injury  Outcome: Adequate for Discharge

## 2024-05-19 NOTE — FLOWSHEET NOTE
Pt ambulated w/steady gait to the bathroom, VSS, NIHS negative. Pt refusing future iv thiamine due to burning sensation. Pt very anxious about family situations/dynamics, tearful at times talking about this. Pt has a lot of worries. Emotional support given. Seeking medication orders to help pt rest, cont to monitor Electronically signed by Taiwo Cole RN on 5/18/2024 at 10:42 PM

## 2024-05-19 NOTE — CARE COORDINATION
Case Management Assessment  Initial Evaluation    Date/Time of Evaluation: 5/18/2024 8:51 PM  Assessment Completed by: Anila Serrato RN    If patient is discharged prior to next notation, then this note serves as note for discharge by case management.    Patient Name: Cameron Waddell                   YOB: 1995  Diagnosis: Stroke-like symptoms [R29.90]  Cerebrovascular accident (CVA), unspecified mechanism (HCC) [I63.9]  Cerebrovascular accident (CVA) due to bilateral embolism of vertebral arteries (HCC) [I63.113]                   Date / Time: 5/17/2024  7:07 PM    Patient Admission Status: Inpatient   Readmission Risk (Low < 19, Mod (19-27), High > 27): Readmission Risk Score: 5.9    Current PCP: No primary care provider on file.  PCP verified by CM? (P) No (given lists of the [facility] pcp's, [facility] and the free clinic.)    Chart Reviewed: Yes      History Provided by: (P) Patient  Patient Orientation: (P) Alert and Oriented, Person, Place, Situation, Self    Patient Cognition: (P) Alert    Hospitalization in the last 30 days (Readmission):  No    If yes, Readmission Assessment in CM Navigator will be completed.    Advance Directives:      Code Status: Full Code   Patient's Primary Decision Maker is: (P) Legal Next of Kin (pt states his friend Wero.)      Discharge Planning:    Patient lives with: (P) Friends Type of Home: (P) House  Primary Care Giver: (P) Self  Patient Support Systems include: (P) Friends/Neighbors   Current Financial resources: (P) Other (Comment) (employed part time.)  Current community resources: (P) None  Current services prior to admission: (P) None            Current DME:              Type of Home Care services:  (P) None    ADLS  Prior functional level: (P) Independent in ADLs/IADLs  Current functional level: (P) Independent in ADLs/IADLs    PT AM-PAC:   /24  OT AM-PAC:   /24    Family can provide assistance at DC:    Would you like Case Management to discuss the

## 2024-05-19 NOTE — ACP (ADVANCE CARE PLANNING)
Advance Care Planning     Advance Care Planning Activator (Inpatient)  Conversation Note      Date of ACP Conversation: 5/18/2024     Conversation Conducted with: Patient with Decision Making Capacity    ACP Activator: Anila Serrato, ROSSY        Health Care Decision Maker:     Current Designated Health Care Decision Maker:     Primary Decision Maker: Wero Chang - Friend - [phone]

## 2024-05-20 ENCOUNTER — HOSPITAL ENCOUNTER (INPATIENT)
Age: 29
Discharge: HOME | DRG: 103 | End: 2024-05-22
Attending: INTERNAL MEDICINE

## 2024-05-20 VITALS
SYSTOLIC BLOOD PRESSURE: 120 MMHG | DIASTOLIC BLOOD PRESSURE: 65 MMHG | OXYGEN SATURATION: 100 % | BODY MASS INDEX: 23.29 KG/M2 | HEART RATE: 59 BPM | TEMPERATURE: 97.5 F | HEIGHT: 70 IN | WEIGHT: 162.7 LBS | RESPIRATION RATE: 18 BRPM

## 2024-05-20 VITALS
OXYGEN SATURATION: 96 % | RESPIRATION RATE: 10 BRPM | HEART RATE: 59 BPM | DIASTOLIC BLOOD PRESSURE: 69 MMHG | TEMPERATURE: 97.7 F | SYSTOLIC BLOOD PRESSURE: 114 MMHG

## 2024-05-20 DIAGNOSIS — I63.9 CEREBROVASCULAR ACCIDENT (CVA), UNSPECIFIED MECHANISM (HCC): ICD-10-CM

## 2024-05-20 LAB
ANION GAP SERPL CALCULATED.3IONS-SCNC: 10 MEQ/L (ref 9–15)
BUN SERPL-MCNC: 10 MG/DL (ref 6–20)
CA-I ADJ PH7.4 SERPL-SCNC: 1.27 MMOL/L (ref 1.09–1.3)
CA-I SERPL ISE-SCNC: 1.2 MMOL/L (ref 1.09–1.3)
CALCIUM SERPL-MCNC: 9.3 MG/DL (ref 8.5–9.9)
CHLORIDE SERPL-SCNC: 107 MEQ/L (ref 95–107)
CO2 SERPL-SCNC: 25 MEQ/L (ref 20–31)
CREAT SERPL-MCNC: 1.13 MG/DL (ref 0.7–1.2)
EKG ATRIAL RATE: 88 BPM
EKG P AXIS: 65 DEGREES
EKG P-R INTERVAL: 102 MS
EKG Q-T INTERVAL: 362 MS
EKG QRS DURATION: 90 MS
EKG QTC CALCULATION (BAZETT): 438 MS
EKG R AXIS: 59 DEGREES
EKG T AXIS: 64 DEGREES
EKG VENTRICULAR RATE: 88 BPM
ERYTHROCYTE [DISTWIDTH] IN BLOOD BY AUTOMATED COUNT: 11.8 % (ref 11.5–14.5)
GLUCOSE SERPL-MCNC: 86 MG/DL (ref 70–99)
HCT VFR BLD AUTO: 45.3 % (ref 42–52)
HGB BLD-MCNC: 16.1 G/DL (ref 14–18)
MAGNESIUM SERPL-MCNC: 2.3 MG/DL (ref 1.7–2.4)
MCH RBC QN AUTO: 31.7 PG (ref 27–31.3)
MCHC RBC AUTO-ENTMCNC: 35.5 % (ref 33–37)
MCV RBC AUTO: 89.2 FL (ref 79–92.2)
PHOSPHATE SERPL-MCNC: 4.1 MG/DL (ref 2.3–4.8)
PLATELET # BLD AUTO: 235 K/UL (ref 130–400)
POTASSIUM SERPL-SCNC: 4.4 MEQ/L (ref 3.4–4.9)
RBC # BLD AUTO: 5.08 M/UL (ref 4.7–6.1)
SODIUM SERPL-SCNC: 142 MEQ/L (ref 135–144)
WBC # BLD AUTO: 7 K/UL (ref 4.8–10.8)

## 2024-05-20 PROCEDURE — 85027 COMPLETE CBC AUTOMATED: CPT

## 2024-05-20 PROCEDURE — 83735 ASSAY OF MAGNESIUM: CPT

## 2024-05-20 PROCEDURE — 7100000010 HC PHASE II RECOVERY - FIRST 15 MIN: Performed by: INTERNAL MEDICINE

## 2024-05-20 PROCEDURE — 99253 IP/OBS CNSLTJ NEW/EST LOW 45: CPT | Performed by: INTERNAL MEDICINE

## 2024-05-20 PROCEDURE — APPSS45 APP SPLIT SHARED TIME 31-45 MINUTES: Performed by: NURSE PRACTITIONER

## 2024-05-20 PROCEDURE — 93320 DOPPLER ECHO COMPLETE: CPT | Performed by: INTERNAL MEDICINE

## 2024-05-20 PROCEDURE — 84100 ASSAY OF PHOSPHORUS: CPT

## 2024-05-20 PROCEDURE — 93312 ECHO TRANSESOPHAGEAL: CPT | Performed by: INTERNAL MEDICINE

## 2024-05-20 PROCEDURE — 6370000000 HC RX 637 (ALT 250 FOR IP): Performed by: INTERNAL MEDICINE

## 2024-05-20 PROCEDURE — 99152 MOD SED SAME PHYS/QHP 5/>YRS: CPT | Performed by: INTERNAL MEDICINE

## 2024-05-20 PROCEDURE — 93325 DOPPLER ECHO COLOR FLOW MAPG: CPT | Performed by: INTERNAL MEDICINE

## 2024-05-20 PROCEDURE — 99233 SBSQ HOSP IP/OBS HIGH 50: CPT | Performed by: PSYCHIATRY & NEUROLOGY

## 2024-05-20 PROCEDURE — 6360000002 HC RX W HCPCS: Performed by: INTERNAL MEDICINE

## 2024-05-20 PROCEDURE — 36415 COLL VENOUS BLD VENIPUNCTURE: CPT

## 2024-05-20 PROCEDURE — B24BZZ4 ULTRASONOGRAPHY OF HEART WITH AORTA, TRANSESOPHAGEAL: ICD-10-PCS | Performed by: INTERNAL MEDICINE

## 2024-05-20 PROCEDURE — 93320 DOPPLER ECHO COMPLETE: CPT

## 2024-05-20 PROCEDURE — 80048 BASIC METABOLIC PNL TOTAL CA: CPT

## 2024-05-20 PROCEDURE — 2580000003 HC RX 258: Performed by: INTERNAL MEDICINE

## 2024-05-20 PROCEDURE — 7100000011 HC PHASE II RECOVERY - ADDTL 15 MIN: Performed by: INTERNAL MEDICINE

## 2024-05-20 RX ORDER — BUTALBITAL, ACETAMINOPHEN AND CAFFEINE 50; 325; 40 MG/1; MG/1; MG/1
1 TABLET ORAL EVERY 8 HOURS PRN
Qty: 21 TABLET | Refills: 0 | Status: SHIPPED | OUTPATIENT
Start: 2024-05-20 | End: 2024-05-27

## 2024-05-20 RX ORDER — FENTANYL CITRATE 50 UG/ML
INJECTION, SOLUTION INTRAMUSCULAR; INTRAVENOUS PRN
Status: COMPLETED | OUTPATIENT
Start: 2024-05-20 | End: 2024-05-20

## 2024-05-20 RX ORDER — TOPIRAMATE 50 MG/1
TABLET, FILM COATED ORAL
Qty: 63 TABLET | Refills: 1 | Status: SHIPPED | OUTPATIENT
Start: 2024-05-20

## 2024-05-20 RX ORDER — ASPIRIN 81 MG/1
81 TABLET, CHEWABLE ORAL DAILY
Qty: 30 TABLET | Refills: 3 | Status: SHIPPED | OUTPATIENT
Start: 2024-05-21

## 2024-05-20 RX ORDER — MIDAZOLAM HYDROCHLORIDE 1 MG/ML
INJECTION INTRAMUSCULAR; INTRAVENOUS PRN
Status: COMPLETED | OUTPATIENT
Start: 2024-05-20 | End: 2024-05-20

## 2024-05-20 RX ADMIN — Medication 10 ML: at 08:00

## 2024-05-20 RX ADMIN — MIDAZOLAM 1 MG: 1 INJECTION, SOLUTION INTRAMUSCULAR; INTRAVENOUS at 08:51

## 2024-05-20 RX ADMIN — FENTANYL CITRATE 25 MCG: 50 INJECTION, SOLUTION INTRAMUSCULAR; INTRAVENOUS at 08:48

## 2024-05-20 RX ADMIN — BENZOCAINE 1 EACH: 200 SPRAY DENTAL; ORAL; PERIODONTAL at 08:45

## 2024-05-20 RX ADMIN — FENTANYL CITRATE 50 MCG: 50 INJECTION, SOLUTION INTRAMUSCULAR; INTRAVENOUS at 08:47

## 2024-05-20 RX ADMIN — MIDAZOLAM 1 MG: 1 INJECTION, SOLUTION INTRAMUSCULAR; INTRAVENOUS at 08:48

## 2024-05-20 RX ADMIN — FENTANYL CITRATE 25 MCG: 50 INJECTION, SOLUTION INTRAMUSCULAR; INTRAVENOUS at 08:50

## 2024-05-20 RX ADMIN — MIDAZOLAM 2 MG: 1 INJECTION, SOLUTION INTRAMUSCULAR; INTRAVENOUS at 08:47

## 2024-05-20 RX ADMIN — Medication 100 MG: at 08:00

## 2024-05-20 RX ADMIN — ASPIRIN 81 MG 81 MG: 81 TABLET ORAL at 08:00

## 2024-05-20 ASSESSMENT — ENCOUNTER SYMPTOMS
ALLERGIC/IMMUNOLOGIC NEGATIVE: 1
COUGH: 0
NAUSEA: 0
TROUBLE SWALLOWING: 0
WHEEZING: 0
ABDOMINAL PAIN: 0
GASTROINTESTINAL NEGATIVE: 1
SHORTNESS OF BREATH: 0
COLOR CHANGE: 0
VOMITING: 0
EYES NEGATIVE: 1
CHEST TIGHTNESS: 0

## 2024-05-20 NOTE — PROGRESS NOTES
[facility]  Occupational Therapy        NAME:  Cameron Waddell  ROOM: W271/W271-01  :  1995  DATE: 2024    Attempted to see Cameron Waddell on this date for:   [x]  Initial Evaluation   []  Treatment       Patient was unable to be seen due to:   [] Off unit for testing/procedure    [x] Patient declined due to being independent and ready for discharge   [] Therapy on hold due to     [] Nursing deferred due to       Electronically signed by CHECO Griffin on 24 at 12:00 PM EDT  
Hospitalist Progress Note      PCP: No primary care provider on file.    Date of Admission: 5/17/2024    Chief Complaint:  no acute events, afebrile, stable HD, s/p GURDEEP done today that was negative per cardiology    Medications:  Reviewed    Infusion Medications    sodium chloride      sodium chloride       Scheduled Medications    thiamine  100 mg Oral Daily    melatonin  5 mg Oral Nightly    sodium chloride flush  5-40 mL IntraVENous 2 times per day    sodium chloride flush  5-40 mL IntraVENous 2 times per day    enoxaparin  40 mg SubCUTAneous Daily    aspirin  81 mg Oral Daily    Or    aspirin  300 mg Rectal Daily    atorvastatin  40 mg Oral Nightly     PRN Meds: sodium chloride flush, sodium chloride, dextrose bolus, sodium chloride flush, sodium chloride, ondansetron **OR** ondansetron, polyethylene glycol, hydrALAZINE      Intake/Output Summary (Last 24 hours) at 5/20/2024 1231  Last data filed at 5/19/2024 1511  Gross per 24 hour   Intake 480 ml   Output --   Net 480 ml       Exam:    /65   Pulse 59   Temp 97.5 °F (36.4 °C) (Oral)   Resp 18   Ht 1.778 m (5' 10\")   Wt 73.8 kg (162 lb 11.2 oz)   SpO2 100%   BMI 23.34 kg/m²     General appearance: appears stated age and cooperative.  Respiratory:  clear to auscultation bilaterally  Cardiovascular: Regular rate and rhythm, S1/S2   Abdomen: Soft, active bowel sounds.  Musculoskeletal: No edema bilaterally.      Labs:   Recent Labs     05/17/24 1915 05/18/24  0552 05/20/24  0501   WBC 8.0 6.8 7.0   HGB 17.1 15.4 16.1   HCT 47.1 43.3 45.3    241 235     Recent Labs     05/17/24 1945 05/18/24  1627 05/20/24  0501     --  142   K 4.5  --  4.4     --  107   CO2 19*  --  25   BUN 8  --  10   CREATININE 0.90  --  1.13   CALCIUM 9.7  --  9.3   PHOS 1.3* 4.0 4.1     Recent Labs     05/17/24 1945   AST 30   ALT 36   BILITOT 0.8*   ALKPHOS 53     Recent Labs     05/17/24 1915   INR 0.9     No results for input(s): \"CKTOTAL\", \"TROPONINI\" in 
Neurology Follow up    SUBJECTIVE: Patient is completely asymptomatic.  Grandmother at bedside findings discussed  Current Facility-Administered Medications   Medication Dose Route Frequency Provider Last Rate Last Admin    thiamine tablet 100 mg  100 mg Oral Daily Camille Ram DO   100 mg at 05/19/24 0842    melatonin disintegrating tablet 5 mg  5 mg Oral Nightly Camille Ram,         sodium chloride flush 0.9 % injection 5-40 mL  5-40 mL IntraVENous 2 times per day Suzan Everett MD   10 mL at 05/19/24 0845    sodium chloride flush 0.9 % injection 5-40 mL  5-40 mL IntraVENous PRN Suzan Everett MD        0.9 % sodium chloride infusion   IntraVENous PRN Suzan Everett MD        dextrose bolus 10% 125 mL  125 mL IntraVENous PRN Suzan Everett MD        sodium chloride flush 0.9 % injection 5-40 mL  5-40 mL IntraVENous 2 times per day Camille Ram DO   10 mL at 05/19/24 0843    sodium chloride flush 0.9 % injection 5-40 mL  5-40 mL IntraVENous PRN Camille Ram DO        0.9 % sodium chloride infusion   IntraVENous PRN Camille Ram DO        ondansetron (ZOFRAN-ODT) disintegrating tablet 4 mg  4 mg Oral Q8H PRN Camille Ram DO        Or    ondansetron (ZOFRAN) injection 4 mg  4 mg IntraVENous Q6H PRN Camille Ram DO        polyethylene glycol (GLYCOLAX) packet 17 g  17 g Oral Daily PRN Camille Ram DO        enoxaparin (LOVENOX) injection 40 mg  40 mg SubCUTAneous Daily Camille Ram DO   40 mg at 05/19/24 0843    aspirin chewable tablet 81 mg  81 mg Oral Daily Camille Ram DO   81 mg at 05/19/24 0843    Or    aspirin suppository 300 mg  300 mg Rectal Daily Camille Ram DO        atorvastatin (LIPITOR) tablet 40 mg  40 mg Oral Nightly Camille Ram DO   40 mg at 05/18/24 2127    hydrALAZINE (APRESOLINE) injection 10 mg  10 mg IntraVENous Q6H PRN Camille Ram DO           PHYSICAL EXAM:    /68   Pulse 61   Temp 97.9 °F (36.6 °C) (Oral)   Resp 18   Ht 1.778 m 
Physical Therapy Missed Treatment   Facility/Department: [facility] MED SURG W271/W271-01    NAME: Cameron Waddell    : 1995 (29 y.o.)  MRN: [id number]    Account: [id number]  Gender: male      Pt declined PT eval due to pt reports indep in room.      Ofelia Hu, PT, 24 at 1:43 PM      
Physician Progress Note    5/19/2024   7:58 AM    Name:  Cameron Waddell  MRN:    [id number]      Day: 2     Admit Date: 5/17/2024  7:07 PM  PCP: No primary care provider on file.    Code Status:  Full Code    Subjective:     Feels fine    Current Facility-Administered Medications   Medication Dose Route Frequency Provider Last Rate Last Admin    thiamine tablet 100 mg  100 mg Oral Daily Camille Ram, DO        melatonin disintegrating tablet 5 mg  5 mg Oral Nightly Camille Ram DO        sodium chloride flush 0.9 % injection 5-40 mL  5-40 mL IntraVENous 2 times per day Suzan Everett MD   10 mL at 05/18/24 0957    sodium chloride flush 0.9 % injection 5-40 mL  5-40 mL IntraVENous PRN Suzan Everett MD        0.9 % sodium chloride infusion   IntraVENous PRN Suzan Everett MD        dextrose bolus 10% 125 mL  125 mL IntraVENous PRN Suzan Everett MD        sodium chloride flush 0.9 % injection 5-40 mL  5-40 mL IntraVENous 2 times per day Camille Ram, DO   10 mL at 05/18/24 0957    sodium chloride flush 0.9 % injection 5-40 mL  5-40 mL IntraVENous PRN Camille Ram DO        0.9 % sodium chloride infusion   IntraVENous PRN Camille Ram DO        ondansetron (ZOFRAN-ODT) disintegrating tablet 4 mg  4 mg Oral Q8H PRN Camille Ram DO        Or    ondansetron (ZOFRAN) injection 4 mg  4 mg IntraVENous Q6H PRN Camille Ram DO        polyethylene glycol (GLYCOLAX) packet 17 g  17 g Oral Daily PRN Camille Ram DO        enoxaparin (LOVENOX) injection 40 mg  40 mg SubCUTAneous Daily Camille Ram DO   40 mg at 05/18/24 2127    aspirin chewable tablet 81 mg  81 mg Oral Daily Camille Ram, DO   81 mg at 05/18/24 2127    Or    aspirin suppository 300 mg  300 mg Rectal Daily Camille Ram DO        atorvastatin (LIPITOR) tablet 40 mg  40 mg Oral Nightly Camille Ram, DO   40 mg at 05/18/24 2127    hydrALAZINE (APRESOLINE) injection 10 mg  10 mg IntraVENous Q6H PRN Camille Ram DO     
[facility]  Occupational Therapy        NAME:  Cameron Waddell  ROOM:   :  1995  DATE: 2024    Attempted to see Cameron Waddell on this date for:   [x]  Initial Evaluation   []  Treatment       Patient was unable to be seen due to:   [] Off unit for testing/procedure    [] Patient refused, stating \"    [] Therapy on hold due to     [] Nursing deferred due to    [x] Other:  TNK administered at 1954 hrs on 24. Will attempt OT eval after pt 24 hours from TNK administration time per protocol.     Electronically signed by DERRELL Arreola/L on 24 at 4:01 PM EDT    
      Assessment and Plan:        1.  Limb weakness and paresthesia suspect secondary to ketosis and hypophosphatemia with possibility of early refeeding syndrome.  >80 Ketones noted on UA from 5/15 and phosphorus level noted to be low on 5/17 at 1.3.  -Empiric thiamine in case of thiamine deficiency  -D5NS  -Aggressive repletion of phosphorus  -Counseled on cannabinoid hyperemesis syndrome (patient reports cyclic vomiting overnight recently) and alcohol use /poor dietary patterns increasing likelihood of ketosis  -Patient received TNK for initial suspected CVA.  Post TNK monitoring in ICU.  Patient followed by neurology.  MRI/MRI ordered    Diet: ADULT DIET; Regular  Full Code    Electronically signed by Herrera Puri DO on 5/18/2024 at 9:09 AM    
initiate topiramate and as an outpatient may require CGRP's for his headaches.  Hypercoagulable workup in process.  Vasospasm from very high doses of marijuana cannot be ruled out.  60% time spent on evaluating      Andres Leal MD, TAMI  Diplomate, American Board of Psychiatry & Neurology  Board Certified in Vascular Neurology  Board Certified in Neuromuscular Medicine  Certified in Neurorehabilitation

## 2024-05-20 NOTE — CARE COORDINATION
Dc plan remains home no needs.   1129 Per Erica in Public benefits, pt qualifies for financial aid but no meds.

## 2024-05-20 NOTE — DISCHARGE SUMMARY
[place] [address]      Phone: [phone]   aspirin 81 MG chewable tablet  butalbital-acetaminophen-caffeine -40 MG per tablet  topiramate 50 MG tablet         Disposition:   Discharged to Home. Any [facility] needs that were indicated and/or required as been addressed and set up by Social Work.     Condition at discharge: Pt was medically stable at the time of discharge. Significant improvement in clinical condition compared to initial condition at presentation to hospital    Activity: activity as tolerated, fall precautions.     Total time taken for discharging this patient: 40 minutes. Greater than 70% of time was spent focused exclusively on this patient. Time was taken to review chart, discuss plans with consultants, reconciling medications, discussing plan answering questions with patient.     Signed:  LUZ NGUYEN MD  5/20/2024, 12:55 PM  ----------------------------------------------------------------------------------------------------------------------    Cameron Waddell,     Please return to ER or call 911 if you develop any significant signs or symptoms.     I may not have addressed all of your medical illnesses or the abnormal blood work or imaging therefore please ask your PCP No primary care provider on file. and other out patient specialists and providers  to obtain [facility] record entirely to follow up on all of the abnormal labs, imaging and findings that I have and have not addressed during your hospitalization.      Discharging you from the hospital does not mean that your medical care ends here and now. You may still need additional work up, investigation, monitoring, and treatment to be handled from this point on by outside providers including your PCP, No primary care provider on file. , Specialists and other healthcare providers.      Please review your list of discharge medications prior to resuming medications you might still have at home, as the medications you need to be taking, dosages

## 2024-05-20 NOTE — CONSULTS
DATE OF CONSULTATION  5/20/2024    CONSULTANT  Juan Castrejon DO     REQUESTING PHYSICIAN  Deshawn Woods MD         REASON FOR CONSULTATION  No chief complaint on file.      Hospital Day: 3       Patient is a 29 y.o. male who presents with a chief complaint of CVA type symptoms, s/p TPA in ER. Patient is followed on a regular basis by Dr. Cruz primary care provider on file.. No prior cardiac hx.   Neuro asking for GURDEEP.     No past medical history on file.   Patient Active Problem List   Diagnosis    Stroke-like symptoms       No past surgical history on file.    Social History     Socioeconomic History    Marital status: Single   Tobacco Use    Smoking status: Former     Types: Cigarettes    Smokeless tobacco: Never   Vaping Use    Vaping Use: Every day    Substances: Nicotine       No family history on file.    Current Facility-Administered Medications   Medication Dose Route Frequency Provider Last Rate Last Admin    thiamine tablet 100 mg  100 mg Oral Daily Camille Ram DO   100 mg at 05/20/24 0800    melatonin disintegrating tablet 5 mg  5 mg Oral Nightly Camille Ram DO   5 mg at 05/19/24 2120    sodium chloride flush 0.9 % injection 5-40 mL  5-40 mL IntraVENous 2 times per day Suzan Everett MD   10 mL at 05/19/24 2121    sodium chloride flush 0.9 % injection 5-40 mL  5-40 mL IntraVENous PRN Suzan Everett MD        0.9 % sodium chloride infusion   IntraVENous PRN Suzan Everett MD        dextrose bolus 10% 125 mL  125 mL IntraVENous PRN Suzan Everett MD        sodium chloride flush 0.9 % injection 5-40 mL  5-40 mL IntraVENous 2 times per day Camille Ram DO   10 mL at 05/20/24 0800    sodium chloride flush 0.9 % injection 5-40 mL  5-40 mL IntraVENous PRN Camille Ram DO        0.9 % sodium chloride infusion   IntraVENous PRN Camille Ram DO        ondansetron (ZOFRAN-ODT) disintegrating tablet 4 mg  4 mg Oral Q8H PRN Camille Ram DO        Or    ondansetron (ZOFRAN) injection

## 2024-05-20 NOTE — PROGRESS NOTES
Accepted care of this patient.  Report received from Simona BLAIR.  Pt on bedside cardiac monitor.  Pt is A & O x 4, speaking in full sentences.  Pt is sinus darian with a rate of 51.

## 2024-05-20 NOTE — PROGRESS NOTES
Pt report called to Christina BLAIR.  Pt's gag reflex intact.  Pt drinking water without difficulty.  Pt's portable telemetry pack placed on patient.  Transport requested.

## 2024-05-20 NOTE — PROGRESS NOTES
Patient arrived to pre/post from [facility]. Alert and oriented. Denies chest pain, shortness of breath. Consents signed and  reviewed. Vitals stable

## 2024-05-21 LAB
CARDIOLIPIN IGG SER IA-ACNC: <10 GPL
CARDIOLIPIN IGM SER IA-ACNC: <10 MPL
DSDNA AB TITR SER CLIF: 9 IU (ref 0–24)
ENA SS-A IGG SER IA-ACNC: 2 AU/ML (ref 0–40)
ENA SS-B IGG SER IA-ACNC: 0 AU/ML (ref 0–40)
PROT C AG ACT/NOR PPP IA: 92 % (ref 63–153)
PROT S AG ACT/NOR PPP IA: 114 % (ref 84–134)

## 2024-05-22 ENCOUNTER — TELEPHONE (OUTPATIENT)
Dept: NEUROLOGY | Age: 29
End: 2024-05-22

## 2024-05-22 LAB
APTT SCREEN TO CONFIRM RATIO: 0.86 {RATIO}
CONFIRM DRVVT STA-STACLOT: NORMAL S
DRVVT SCREEN TO CONFIRM RATIO: 0.84 {RATIO}
DRVVT SCREEN TO CONFIRM RATIO: NORMAL {RATIO}
DRVVT/DRVVT CFM P DOAC NEUT NORM PPP-RTO: NORMAL {RATIO}
ECHO BSA: 1.91 M2
F5 P.R506Q BLD/T QL: NEGATIVE
HEPARIN NT PPP QL: NORMAL
LA 3 SCREEN W REFLEX-IMP: NORMAL
LMW HEPARIN IND PLT AB SER QL: NORMAL
MIXING DRVVT: NORMAL S
MYELOPEROXIDASE AB SER-ACNC: 0 AU/ML (ref 0–19)
PROTEINASE3 AB SER-ACNC: 0 AU/ML (ref 0–19)
PROTHROMBIN TIME: 12.9 S (ref 12–15.5)
SCREEN APTT: NORMAL S
THROMBIN TIME: NORMAL S

## 2024-05-22 NOTE — TELEPHONE ENCOUNTER
Pt was recently D/C from hospital and is wanting to know if he is okay to go back to work or if he should wait.  Pt states that he needs a letter for his job.  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      Please advise if he is okay to return to work, if there should be restrictions and if he is not okay how long should he remain out of work?     Letter needs to be sent to [email or web address]

## 2024-06-16 ENCOUNTER — HOSPITAL ENCOUNTER (EMERGENCY)
Age: 29
Discharge: HOME | End: 2024-06-16

## 2024-06-16 ENCOUNTER — APPOINTMENT (OUTPATIENT)
Dept: CT IMAGING | Age: 29
End: 2024-06-16

## 2024-06-16 VITALS
WEIGHT: 156 LBS | OXYGEN SATURATION: 100 % | BODY MASS INDEX: 22.33 KG/M2 | HEIGHT: 70 IN | TEMPERATURE: 98.6 F | HEART RATE: 73 BPM | DIASTOLIC BLOOD PRESSURE: 83 MMHG | RESPIRATION RATE: 16 BRPM | SYSTOLIC BLOOD PRESSURE: 115 MMHG

## 2024-06-16 DIAGNOSIS — N20.1 URETEROLITHIASIS: Primary | ICD-10-CM

## 2024-06-16 LAB
ALBUMIN SERPL-MCNC: 4.8 G/DL (ref 3.5–4.6)
ALP SERPL-CCNC: 58 U/L (ref 35–104)
ALT SERPL-CCNC: 34 U/L (ref 0–41)
AMPHET UR QL SCN: ABNORMAL
ANION GAP SERPL CALCULATED.3IONS-SCNC: 12 MEQ/L (ref 9–15)
AST SERPL-CCNC: 40 U/L (ref 0–40)
BARBITURATES UR QL SCN: POSITIVE
BASOPHILS # BLD: 0 K/UL (ref 0–0.2)
BASOPHILS NFR BLD: 0.2 %
BENZODIAZ UR QL SCN: ABNORMAL
BILIRUB SERPL-MCNC: 0.7 MG/DL (ref 0.2–0.7)
BUN SERPL-MCNC: 15 MG/DL (ref 6–20)
CALCIUM SERPL-MCNC: 9.4 MG/DL (ref 8.5–9.9)
CANNABINOIDS UR QL SCN: POSITIVE
CHLORIDE SERPL-SCNC: 108 MEQ/L (ref 95–107)
CO2 SERPL-SCNC: 19 MEQ/L (ref 20–31)
COCAINE UR QL SCN: ABNORMAL
CREAT SERPL-MCNC: 1.26 MG/DL (ref 0.7–1.2)
DRUG SCREEN COMMENT UR-IMP: ABNORMAL
EOSINOPHIL # BLD: 0 K/UL (ref 0–0.7)
EOSINOPHIL NFR BLD: 0.2 %
ERYTHROCYTE [DISTWIDTH] IN BLOOD BY AUTOMATED COUNT: 12.2 % (ref 11.5–14.5)
FENTANYL SCREEN, URINE: ABNORMAL
GLOBULIN SER CALC-MCNC: 2.5 G/DL (ref 2.3–3.5)
GLUCOSE SERPL-MCNC: 85 MG/DL (ref 70–99)
HCT VFR BLD AUTO: 43.9 % (ref 42–52)
HGB BLD-MCNC: 15.8 G/DL (ref 14–18)
LIPASE SERPL-CCNC: 25 U/L (ref 12–95)
LYMPHOCYTES # BLD: 1.2 K/UL (ref 1–4.8)
LYMPHOCYTES NFR BLD: 11.2 %
MAGNESIUM SERPL-MCNC: 2.4 MG/DL (ref 1.7–2.4)
MCH RBC QN AUTO: 31.7 PG (ref 27–31.3)
MCHC RBC AUTO-ENTMCNC: 36 % (ref 33–37)
MCV RBC AUTO: 88 FL (ref 79–92.2)
METHADONE UR QL SCN: ABNORMAL
MONOCYTES # BLD: 0.4 K/UL (ref 0.2–0.8)
MONOCYTES NFR BLD: 4.1 %
NEUTROPHILS # BLD: 8.8 K/UL (ref 1.4–6.5)
NEUTS SEG NFR BLD: 84 %
OPIATES UR QL SCN: ABNORMAL
OXYCODONE UR QL SCN: ABNORMAL
PCP UR QL SCN: ABNORMAL
PLATELET # BLD AUTO: 254 K/UL (ref 130–400)
POC CREATININE WHOLE BLOOD: 1.4
POTASSIUM SERPL-SCNC: 5.6 MEQ/L (ref 3.4–4.9)
PROPOXYPH UR QL SCN: ABNORMAL
PROT SERPL-MCNC: 7.3 G/DL (ref 6.3–8)
RBC # BLD AUTO: 4.99 M/UL (ref 4.7–6.1)
SODIUM SERPL-SCNC: 139 MEQ/L (ref 135–144)
WBC # BLD AUTO: 10.5 K/UL (ref 4.8–10.8)

## 2024-06-16 PROCEDURE — 99285 EMERGENCY DEPT VISIT HI MDM: CPT

## 2024-06-16 PROCEDURE — 85025 COMPLETE CBC W/AUTO DIFF WBC: CPT

## 2024-06-16 PROCEDURE — 83735 ASSAY OF MAGNESIUM: CPT

## 2024-06-16 PROCEDURE — 6360000002 HC RX W HCPCS

## 2024-06-16 PROCEDURE — 80307 DRUG TEST PRSMV CHEM ANLYZR: CPT

## 2024-06-16 PROCEDURE — 2580000003 HC RX 258

## 2024-06-16 PROCEDURE — 74177 CT ABD & PELVIS W/CONTRAST: CPT

## 2024-06-16 PROCEDURE — 80053 COMPREHEN METABOLIC PANEL: CPT

## 2024-06-16 PROCEDURE — 36415 COLL VENOUS BLD VENIPUNCTURE: CPT

## 2024-06-16 PROCEDURE — 81001 URINALYSIS AUTO W/SCOPE: CPT

## 2024-06-16 PROCEDURE — 6360000004 HC RX CONTRAST MEDICATION

## 2024-06-16 PROCEDURE — 96375 TX/PRO/DX INJ NEW DRUG ADDON: CPT

## 2024-06-16 PROCEDURE — 96374 THER/PROPH/DIAG INJ IV PUSH: CPT

## 2024-06-16 PROCEDURE — 83690 ASSAY OF LIPASE: CPT

## 2024-06-16 RX ORDER — ONDANSETRON 2 MG/ML
4 INJECTION INTRAMUSCULAR; INTRAVENOUS ONCE
Status: COMPLETED | OUTPATIENT
Start: 2024-06-16 | End: 2024-06-16

## 2024-06-16 RX ORDER — TRAMADOL HYDROCHLORIDE 50 MG/1
50 TABLET ORAL EVERY 4 HOURS PRN
Qty: 18 TABLET | Refills: 0 | Status: SHIPPED | OUTPATIENT
Start: 2024-06-16 | End: 2024-06-19

## 2024-06-16 RX ORDER — 0.9 % SODIUM CHLORIDE 0.9 %
1000 INTRAVENOUS SOLUTION INTRAVENOUS ONCE
Status: COMPLETED | OUTPATIENT
Start: 2024-06-16 | End: 2024-06-16

## 2024-06-16 RX ORDER — TAMSULOSIN HYDROCHLORIDE 0.4 MG/1
0.4 CAPSULE ORAL DAILY
Qty: 30 CAPSULE | Refills: 0 | Status: SHIPPED | OUTPATIENT
Start: 2024-06-16

## 2024-06-16 RX ORDER — NAPROXEN 500 MG/1
500 TABLET ORAL 2 TIMES DAILY PRN
Qty: 60 TABLET | Refills: 0 | Status: SHIPPED | OUTPATIENT
Start: 2024-06-16

## 2024-06-16 RX ORDER — KETOROLAC TROMETHAMINE 15 MG/ML
15 INJECTION, SOLUTION INTRAMUSCULAR; INTRAVENOUS ONCE
Status: COMPLETED | OUTPATIENT
Start: 2024-06-16 | End: 2024-06-16

## 2024-06-16 RX ADMIN — SODIUM CHLORIDE 1000 ML: 9 INJECTION, SOLUTION INTRAVENOUS at 20:21

## 2024-06-16 RX ADMIN — ONDANSETRON 4 MG: 2 INJECTION INTRAMUSCULAR; INTRAVENOUS at 20:20

## 2024-06-16 RX ADMIN — KETOROLAC TROMETHAMINE 15 MG: 15 INJECTION, SOLUTION INTRAMUSCULAR; INTRAVENOUS at 20:20

## 2024-06-16 RX ADMIN — IOPAMIDOL 75 ML: 755 INJECTION, SOLUTION INTRAVENOUS at 20:43

## 2024-06-16 ASSESSMENT — PAIN DESCRIPTION - ORIENTATION: ORIENTATION: LEFT

## 2024-06-16 ASSESSMENT — PAIN SCALES - GENERAL
PAINLEVEL_OUTOF10: 10
PAINLEVEL_OUTOF10: 7

## 2024-06-16 ASSESSMENT — PAIN DESCRIPTION - DESCRIPTORS
DESCRIPTORS: ACHING
DESCRIPTORS: SHARP

## 2024-06-16 ASSESSMENT — PAIN DESCRIPTION - LOCATION
LOCATION: ABDOMEN
LOCATION: ABDOMEN

## 2024-06-16 ASSESSMENT — PAIN - FUNCTIONAL ASSESSMENT: PAIN_FUNCTIONAL_ASSESSMENT: 0-10

## 2024-06-16 NOTE — ED PROVIDER NOTES
DISPOSITION/PLAN   DISPOSITION Decision To Discharge 06/16/2024 10:37:22 PM      PATIENT REFERRED TO:  No follow-up provider specified.    DISCHARGE MEDICATIONS:  Discharge Medication List as of 6/16/2024 10:37 PM        START taking these medications    Details   naproxen (NAPROSYN) 500 MG tablet Take 1 tablet by mouth 2 times daily as needed for Pain, Disp-60 tablet, R-0Normal      tamsulosin (FLOMAX) 0.4 MG capsule Take 1 capsule by mouth daily, Disp-30 capsule, R-0Normal      traMADol (ULTRAM) 50 MG tablet Take 1 tablet by mouth every 4 hours as needed for Pain for up to 3 days. Intended supply: 3 days. Take lowest dose possible to manage pain Max Daily Amount: 300 mg, Disp-18 tablet, R-0Normal           Controlled Substances Monitoring:          No data to display                (Please note that portions of this note were completed with a voice recognition program.  Efforts were made to edit the dictations but occasionally words are mis-transcribed.)    TAMIA Pope (electronically signed)  Attending Emergency Physician  Supervising Physician Kylie Miramontes PA  06/16/24 [id number]

## 2024-06-16 NOTE — ED TRIAGE NOTES
Patient states LLQ pain that started this morning. Denies N/V/D. Patient states took \"tylenol at 1719\". Denies urinary complaints

## 2024-06-17 LAB
BACTERIA URNS QL MICRO: NEGATIVE /HPF
BILIRUB UR QL STRIP: NEGATIVE
CLARITY UR: CLEAR
COLOR UR: YELLOW
EPI CELLS #/AREA URNS AUTO: ABNORMAL /HPF (ref 0–5)
GLUCOSE UR STRIP-MCNC: NEGATIVE MG/DL
HGB UR QL STRIP: ABNORMAL
HYALINE CASTS #/AREA URNS AUTO: ABNORMAL /HPF (ref 0–5)
KETONES UR STRIP-MCNC: 15 MG/DL
LEUKOCYTE ESTERASE UR QL STRIP: NEGATIVE
NITRITE UR QL STRIP: NEGATIVE
PERFORMED ON: ABNORMAL
PERFORMED ON: NORMAL
PH UR STRIP: 6 [PH] (ref 5–9)
POC CREATININE: 1.4 MG/DL (ref 0.8–1.3)
POC SAMPLE TYPE: ABNORMAL
POC SAMPLE TYPE: NORMAL
PROT UR STRIP-MCNC: 30 MG/DL
RBC #/AREA URNS AUTO: ABNORMAL /HPF (ref 0–5)
SP GR UR STRIP: 1.05 (ref 1–1.03)
URINE REFLEX TO CULTURE: ABNORMAL
UROBILINOGEN UR STRIP-ACNC: 1 E.U./DL
WBC #/AREA URNS AUTO: ABNORMAL /HPF (ref 0–5)

## 2024-06-17 NOTE — CARE COORDINATION
This nurse spoke to the patient regarding his follow up care. With his last admission he was given on providers but had not contacted anyplace for an appointment. This nurse provided the patient with a pamphlet of information on [facility] and dentistry and encouraged him to call and make himself an appointment. He stated understanding.

## 2024-07-15 ENCOUNTER — OFFICE VISIT (OUTPATIENT)
Dept: NEUROLOGY | Age: 29
End: 2024-07-15

## 2024-07-15 VITALS
HEART RATE: 71 BPM | DIASTOLIC BLOOD PRESSURE: 70 MMHG | BODY MASS INDEX: 22.24 KG/M2 | SYSTOLIC BLOOD PRESSURE: 120 MMHG | WEIGHT: 155 LBS

## 2024-07-15 DIAGNOSIS — G43.109 BASILAR MIGRAINE: ICD-10-CM

## 2024-07-15 DIAGNOSIS — G43.909 ACUTE CONFUSIONAL MIGRAINE: Primary | ICD-10-CM

## 2024-07-15 DIAGNOSIS — R29.90 STROKE-LIKE SYMPTOMS: ICD-10-CM

## 2024-07-15 PROCEDURE — 99214 OFFICE O/P EST MOD 30 MIN: CPT | Performed by: PSYCHIATRY & NEUROLOGY

## 2024-07-15 RX ORDER — SUMATRIPTAN 100 MG/1
TABLET, FILM COATED ORAL
Qty: 9 TABLET | Refills: 3 | Status: SHIPPED | OUTPATIENT
Start: 2024-07-15

## 2024-07-15 RX ORDER — TOPIRAMATE 100 MG/1
100 TABLET, FILM COATED ORAL 2 TIMES DAILY
Qty: 60 TABLET | Refills: 3 | Status: SHIPPED | OUTPATIENT
Start: 2024-07-15

## 2024-07-15 ASSESSMENT — ENCOUNTER SYMPTOMS
BACK PAIN: 0
CHOKING: 0
PHOTOPHOBIA: 0
COLOR CHANGE: 0
SHORTNESS OF BREATH: 0
VOMITING: 0
NAUSEA: 0
TROUBLE SWALLOWING: 0

## 2024-07-15 NOTE — PROGRESS NOTES
Subjective:      Patient ID: Cameron Waddell is a 29 y.o. male who presents today for:  Chief Complaint   Patient presents with    Follow-Up from Hospital     Pt states things are going ok. Pt states he been having migraines no seizures. Pt states he felt light headedness a couple of days ago but he thinks its from moving too fast at work.        HPI 29 right-handed gentleman with a history of sudden change in mental status.  Patient was at [facility] when this occurred.  Findings at that time consistent with intranuclear found patient and therefore patient was given TNK.  Findings reversed within 10 minutes.  Findings consistent with a vascular theology likely to be confusional basilar migraines or internuclear ophthalmoplegia.  MRI and GURDEEP were normal.  Patient had a complete hypercoagulable workup entirely normal.  Patient still having migraines and occasional lightheadedness.  We had started and initiated him on topiramate in the hospital    Patient's headache frequency is improved considerably.  The patient is now only had like to headaches.  1 was a bigger 1 was was a smaller 1 is not lost weight on topiramate    No past medical history on file.  No past surgical history on file.  Social History     Socioeconomic History    Marital status: Single     Spouse name: Not on file    Number of children: Not on file    Years of education: Not on file    Highest education level: Not on file   Occupational History    Not on file   Tobacco Use    Smoking status: Former     Types: Cigarettes    Smokeless tobacco: Never   Vaping Use    Vaping Use: Every day    Substances: Nicotine   Substance and Sexual Activity    Alcohol use: Not on file    Drug use: Yes     Frequency: 5.0 times per week     Types: Marijuana (Weed)    Sexual activity: Not on file   Other Topics Concern    Not on file   Social History Narrative    Not on file     Social Determinants of Health     Financial Resource Strain: Not on file   Food Insecurity:

## 2024-11-18 ENCOUNTER — OFFICE VISIT (OUTPATIENT)
Dept: NEUROLOGY | Age: 29
End: 2024-11-18

## 2024-11-18 VITALS
BODY MASS INDEX: 21.52 KG/M2 | HEART RATE: 62 BPM | WEIGHT: 150 LBS | SYSTOLIC BLOOD PRESSURE: 120 MMHG | DIASTOLIC BLOOD PRESSURE: 80 MMHG

## 2024-11-18 DIAGNOSIS — G43.909 ACUTE CONFUSIONAL MIGRAINE: Primary | ICD-10-CM

## 2024-11-18 DIAGNOSIS — R29.90 STROKE-LIKE SYMPTOMS: ICD-10-CM

## 2024-11-18 DIAGNOSIS — G43.109 BASILAR MIGRAINE: ICD-10-CM

## 2024-11-18 DIAGNOSIS — I63.81 CEREBROVASCULAR ACCIDENT (CVA) DUE TO STENOSIS OF SMALL ARTERY (HCC): ICD-10-CM

## 2024-11-18 PROCEDURE — 99214 OFFICE O/P EST MOD 30 MIN: CPT | Performed by: PSYCHIATRY & NEUROLOGY

## 2024-11-18 RX ORDER — TOPIRAMATE 100 MG/1
100 TABLET, FILM COATED ORAL 2 TIMES DAILY
Qty: 60 TABLET | Refills: 3 | Status: SHIPPED | OUTPATIENT
Start: 2024-11-18

## 2024-11-18 NOTE — PROGRESS NOTES
Allergic/Immunologic: Negative for food allergies.   Neurological:  Negative for dizziness, tremors, seizures, syncope, facial asymmetry, speech difficulty, weakness, light-headedness, numbness and headaches.   Psychiatric/Behavioral:  Negative for behavioral problems, confusion, hallucinations and sleep disturbance.        Objective:   /80 (Site: Left Upper Arm, Position: Sitting, Cuff Size: Medium Adult)   Pulse 62   Wt 68 kg (150 lb)   BMI 21.52 kg/m²     Physical Exam  Vitals reviewed.   Eyes:      Pupils: Pupils are equal, round, and reactive to light.   Cardiovascular:      Rate and Rhythm: Normal rate and regular rhythm.      Heart sounds: No murmur heard.  Pulmonary:      Effort: Pulmonary effort is normal.   Musculoskeletal:         General: Normal range of motion.      Cervical back: Normal range of motion.   Skin:     General: Skin is warm.   Neurological:      Mental Status: He is alert and oriented to person, place, and time.      Cranial Nerves: No cranial nerve deficit.      Sensory: No sensory deficit.      Motor: No abnormal muscle tone.      Coordination: Coordination normal.      Deep Tendon Reflexes: Reflexes are normal and symmetric. Babinski sign absent on the right side. Babinski sign absent on the left side.   Psychiatric:         Mood and Affect: Mood normal.         CT ABDOMEN PELVIS W IV CONTRAST Additional Contrast? None    Result Date: 6/16/2024  EXAMINATION: CT OF THE ABDOMEN AND PELVIS WITH CONTRAST 6/16/2024 8:40 pm TECHNIQUE: CT of the abdomen and pelvis was performed with the administration of intravenous contrast. Multiplanar reformatted images are provided for review. Automated exposure control, iterative reconstruction, and/or weight based adjustment of the mA/kV was utilized to reduce the radiation dose to as low as reasonably achievable. COMPARISON: CT abdomen and pelvis from May 15, 2024 HISTORY: ORDERING SYSTEM PROVIDED HISTORY: LLQ abd pain TECHNOLOGIST PROVIDED

## 2025-05-19 ENCOUNTER — OFFICE VISIT (OUTPATIENT)
Age: 30
End: 2025-05-19

## 2025-05-19 VITALS
WEIGHT: 148 LBS | BODY MASS INDEX: 21.24 KG/M2 | HEART RATE: 63 BPM | DIASTOLIC BLOOD PRESSURE: 74 MMHG | SYSTOLIC BLOOD PRESSURE: 116 MMHG

## 2025-05-19 DIAGNOSIS — G43.109 BASILAR MIGRAINE: Primary | ICD-10-CM

## 2025-05-19 DIAGNOSIS — R29.90 STROKE-LIKE SYMPTOMS: ICD-10-CM

## 2025-05-19 DIAGNOSIS — I63.81 CEREBROVASCULAR ACCIDENT (CVA) DUE TO STENOSIS OF SMALL ARTERY (HCC): ICD-10-CM

## 2025-05-19 PROCEDURE — 99214 OFFICE O/P EST MOD 30 MIN: CPT | Performed by: PSYCHIATRY & NEUROLOGY

## 2025-05-19 PROCEDURE — 99213 OFFICE O/P EST LOW 20 MIN: CPT | Performed by: PSYCHIATRY & NEUROLOGY

## 2025-05-19 RX ORDER — SUMATRIPTAN SUCCINATE 100 MG/1
TABLET ORAL
Qty: 9 TABLET | Refills: 3 | Status: SHIPPED | OUTPATIENT
Start: 2025-05-19

## 2025-05-19 RX ORDER — BUTALBITAL, ACETAMINOPHEN AND CAFFEINE 50; 325; 40 MG/1; MG/1; MG/1
1 TABLET ORAL EVERY 8 HOURS PRN
Qty: 21 TABLET | Refills: 0 | Status: SHIPPED | OUTPATIENT
Start: 2025-05-19 | End: 2025-05-26

## 2025-05-19 RX ORDER — TOPIRAMATE 100 MG/1
100 TABLET, FILM COATED ORAL 2 TIMES DAILY
Qty: 60 TABLET | Refills: 3 | Status: SHIPPED | OUTPATIENT
Start: 2025-05-19

## 2025-05-19 NOTE — PROGRESS NOTES
HGB 15.8 06/16/2024 08:15 PM    HCT 43.9 06/16/2024 08:15 PM    MCV 88.0 06/16/2024 08:15 PM    MCH 31.7 06/16/2024 08:15 PM    MCHC 36.0 06/16/2024 08:15 PM    RDW 12.2 06/16/2024 08:15 PM     06/16/2024 08:15 PM     Lab Results   Component Value Date/Time     06/16/2024 08:15 PM    K 5.6 06/16/2024 08:15 PM    K 4.5 05/17/2024 07:45 PM     06/16/2024 08:15 PM    CO2 19 06/16/2024 08:15 PM    BUN 15 06/16/2024 08:15 PM    CREATININE 1.4 06/16/2024 08:25 PM    CREATININE 1.26 06/16/2024 08:15 PM    LABGLOM 71 06/16/2024 08:25 PM    GLUCOSE 85 06/16/2024 08:15 PM    CALCIUM 9.4 06/16/2024 08:15 PM    BILITOT 0.7 06/16/2024 08:15 PM    ALKPHOS 58 06/16/2024 08:15 PM    AST 40 06/16/2024 08:15 PM    ALT 34 06/16/2024 08:15 PM     Lab Results   Component Value Date/Time    PROTIME 12.9 05/18/2024 09:06 AM    INR 0.9 05/17/2024 07:15 PM     Lab Results   Component Value Date/Time    TSH 1.900 05/17/2024 07:45 PM     Lab Results   Component Value Date/Time    TRIG 92 05/18/2024 05:52 AM    HDL 41 05/18/2024 05:52 AM     Lab Results   Component Value Date/Time    BARBSCNU POSITIVE 06/16/2024 09:26 PM    LABBENZ Neg 06/16/2024 09:26 PM    LABMETH Neg 06/16/2024 09:26 PM    ETOH <10 05/17/2024 07:15 PM     No results found for: \"LITHIUM\", \"DILFRTOT\", \"VALPROATE\"    Assessment:       Diagnosis Orders   1. Basilar migraine  butalbital-acetaminophen-caffeine (FIORICET, ESGIC) -40 MG per tablet      2. Stroke-like symptoms        3. Cerebrovascular accident (CVA) due to stenosis of small artery (HCC)        Basilar migraine with strokelike symptoms.  Patient was actually treated with TNK given this findings.  Workup was negative.  We now have him on butalbital for headache and he has not had any further episodes since about November.  Patient is supposed to be on aspirin which he is not taking I recommended that he continue to do this as we are not quite sure of his stroke risks for now.  He is supposed